# Patient Record
Sex: FEMALE
[De-identification: names, ages, dates, MRNs, and addresses within clinical notes are randomized per-mention and may not be internally consistent; named-entity substitution may affect disease eponyms.]

---

## 2020-04-15 ENCOUNTER — NURSE TRIAGE (OUTPATIENT)
Dept: OTHER | Facility: CLINIC | Age: 25
End: 2020-04-15

## 2020-08-26 ENCOUNTER — NURSE TRIAGE (OUTPATIENT)
Dept: OTHER | Facility: CLINIC | Age: 25
End: 2020-08-26

## 2020-08-26 NOTE — TELEPHONE ENCOUNTER
Reason for Disposition   Symptoms of a yeast infection' (i.e., itchy, white discharge, not bad smelling) and not improved > 3 days following Care Advice    Protocols used: VAGINAL DISCHARGE-ADULT-[place]    Pt calling for MMO benefit. She states she has a known yeast infection but cannot get in with her doctor for several more days. She is asking about OTC meds that she can take for her symptoms. Pt c/o white discharge and some itching. No fever. No pain. Pt provided info on OTC antifungal remedies. Pt instructed that if medications did not clear up symptoms, she would need to f/u with provider. Patient verbalized understanding. Patient denies any other questions or concerns. Please do not respond to the triage nurse through this encounter. Any subsequent communication should be directly with the patient.

## 2020-10-14 ENCOUNTER — NURSE TRIAGE (OUTPATIENT)
Dept: OTHER | Facility: CLINIC | Age: 25
End: 2020-10-14

## 2020-10-14 NOTE — TELEPHONE ENCOUNTER
Reason for Disposition   Unexplained headache that is present > 24 hours    Answer Assessment - Initial Assessment Questions  1. LOCATION: \"Where does it hurt? \"       Behind eyes    2. ONSET: \"When did the headache start? \" (Minutes, hours or days)       Couple days    3. PATTERN: \"Does the pain come and go, or has it been constant since it started? \"      Hx of migranes but this is different location of pain    4. SEVERITY: \"How bad is the pain? \" and \"What does it keep you from doing? \"  (e.g., Scale 1-10; mild, moderate, or severe)    - MILD (1-3): doesn't interfere with normal activities     - MODERATE (4-7): interferes with normal activities or awakens from sleep     - SEVERE (8-10): excruciating pain, unable to do any normal activities         5/10    5. RECURRENT SYMPTOM: \"Have you ever had headaches before? \" If so, ask: \"When was the last time? \" and \"What happened that time? \"       Yes hx migranes    6. CAUSE: \"What do you think is causing the headache? \"      Unsure    7. MIGRAINE: \"Have you been diagnosed with migraine headaches? \" If so, ask: \"Is this headache similar? \"       Yes migranes    8. HEAD INJURY: \"Has there been any recent injury to the head? \"       Denies    9. OTHER SYMPTOMS: \"Do you have any other symptoms? \" (fever, stiff neck, eye pain, sore throat, cold symptoms)      Allergy symptoms    10. PREGNANCY: \"Is there any chance you are pregnant? \" \"When was your last menstrual period? \"        Denies    Protocols used: HEADACHE-ADULT-[place]    Pt called on MMO nurse access line     Pt has questions regarding H/a with a hx of migraines Triage to be seen in 1 or 2 days at pcp or walk in clinic pt verbalized and understood plan of care.

## 2021-05-10 ENCOUNTER — NURSE TRIAGE (OUTPATIENT)
Dept: OTHER | Facility: CLINIC | Age: 26
End: 2021-05-10

## 2021-05-10 NOTE — TELEPHONE ENCOUNTER
Brief description of triage: Pt states around 13a while a work she started to get a Migraine which she has a hx of but her vision became blurred and speech was garbled for about 5 minutes. She states she took Motrin and the Migraine was better but she started to have intermittent numbness in right arm. Symptoms have subsided at this time. see assessment below. Triage indicates for patient to go to Dr. Raya Koenig now or go to ED if unable to be seen now. Care advice provided, patient verbalizes understanding; denies any other questions or concerns; instructed to call back for any new or worsening symptoms. This triage is a result of a call to [address] [place] [address]. Please do not respond to the triage nurse through this encounter. Any subsequent communication should be directly with the patient. Reason for Disposition   Neurologic deficit that was brief (now gone), ANY of the following: * Weakness of the face, arm, or leg on one side of the body * Numbness of the face, arm, or leg on one side of the body * Loss of speech or garbled speech    Answer Assessment - Initial Assessment Questions  1. SYMPTOM: \"What is the main symptom you are concerned about? \" (e.g., weakness, numbness)      Pt states numbness in the right arm and hand that is more worse at times    2. ONSET: \"When did this start? \" (minutes, hours, days; while sleeping)     11a today    3. LAST NORMAL: \"When was the last time you were normal (no symptoms)? \"      Until 11a    4. PATTERN \"Does this come and go, or has it been constant since it started? \"  \"Is it present now? \"      Pt states the numbness comes and goes     5. CARDIAC SYMPTOMS: \"Have you had any of the following symptoms: chest pain, difficulty breathing, palpitations? \"      Pt denies    6. NEUROLOGIC SYMPTOMS: \"Have you had any of the following symptoms: headache, dizziness, vision loss, double vision, changes in speech, unsteady on your feet? \"      Migraine with blurred vision and states she had slurred speech for 5-10 minutes    7. OTHER SYMPTOMS: \"Do you have any other symptoms? \"     Pt denies    8. PREGNANCY: \"Is there any chance you are pregnant? \" \"When was your last menstrual period? \"     Denies 4/22-4/28    Protocols used: NEUROLOGIC DEFICIT-ADULT-[place]